# Patient Record
(demographics unavailable — no encounter records)

---

## 2018-10-09 NOTE — RAD
SUPINE ABDOMEN:

10/9/18

 

INDICATIONS:

Renal calculus. 

 

COMPARISON:  

3/23/18.

 

There is an irregular shaped calculus overlying the lower pole of the left kidney which is stable fro
m the prior exam. No other definite urinary tract calculus. Kidneys however, are obscured by bowel co
ntent and a small calculi could be obscured. Bowel gas pattern is unremarkable. 

 

IMPRESSION:  

Calcifications overlying the lower pole left kidney appears stable.

 

POS: MAIRA

## 2020-02-28 NOTE — RAD
KUB:

2/28/2020



COMPARISON: 10/9/2018



HISTORY: Nephrolithiasis



FINDINGS: Multiple small calcifications are noted in the left upper quadrant suggesting multiple left
 renal calculi. This includes 2 stones within the region of the lower pole left kidney measuring up

to 5 mm each. Additional punctate renal calculi are seen in the region of the mid pole and lower pole
 left kidney as well. Right renal shadow is obscured by stool. There are calcifications in the

pelvis suggesting vascular calcification.



There is degenerative change involving the pubic symphysis.



IMPRESSION: Findings suggesting multiple left renal calculi. Right renal shadow obscured by stool.



Reported By: Ranjeet Raygoza 

Electronically Signed:  2/28/2020 12:27 PM